# Patient Record
Sex: FEMALE | Race: WHITE | NOT HISPANIC OR LATINO | Employment: OTHER | URBAN - METROPOLITAN AREA
[De-identification: names, ages, dates, MRNs, and addresses within clinical notes are randomized per-mention and may not be internally consistent; named-entity substitution may affect disease eponyms.]

---

## 2023-01-31 ENCOUNTER — APPOINTMENT (OUTPATIENT)
Dept: RADIOLOGY | Facility: CLINIC | Age: 43
End: 2023-01-31

## 2023-01-31 ENCOUNTER — OFFICE VISIT (OUTPATIENT)
Dept: OBGYN CLINIC | Facility: CLINIC | Age: 43
End: 2023-01-31

## 2023-01-31 VITALS
HEART RATE: 96 BPM | HEIGHT: 67 IN | SYSTOLIC BLOOD PRESSURE: 133 MMHG | DIASTOLIC BLOOD PRESSURE: 81 MMHG | BODY MASS INDEX: 43.38 KG/M2 | WEIGHT: 276.4 LBS

## 2023-01-31 DIAGNOSIS — M76.51 PATELLAR TENDONITIS OF RIGHT KNEE: Primary | ICD-10-CM

## 2023-01-31 DIAGNOSIS — Z01.89 ENCOUNTER FOR LOWER EXTREMITY COMPARISON IMAGING STUDY: ICD-10-CM

## 2023-01-31 DIAGNOSIS — M25.561 RIGHT KNEE PAIN, UNSPECIFIED CHRONICITY: ICD-10-CM

## 2023-01-31 NOTE — LETTER
January 31, 2023     Warren Silva, DO  [address] [address]    Patient: Monica Muñiz   YOB: 1980   Date of Visit: 1/31/2023     Dear Dr Sujata Pedersen      Thank you for referring Shannon Alberto Israel to me for evaluation  Below are the relevant portions of my assessment and plan of care  If you have questions, please do not hesitate to call me  I look forward to following Shannon Alberto along with you           Sincerely,        Clarissa Marquez MD        CC: No Recipients    Progress Notes:

## 2023-01-31 NOTE — PROGRESS NOTES
Assessment/Plan:  1  Patellar tendonitis of right knee  XR knee 1 or 2 vw left    Ambulatory Referral to Physical Therapy      2  Right knee pain, unspecified chronicity  XR knee 3 vw right non injury    Ambulatory Referral to Physical Therapy        Scribe Attestation    I,:  Rosmery Torres am acting as a scribe while in the presence of the attending physician :       I,:  Kevin Saeed MD personally performed the services described in this documentation    as scribed in my presence :         Jr Golden upon examination and review of the x-rays of the right knee does demonstrate signs and symptoms consistent with patellar tendinitis  I did note that there is a chance that there is a small partial tear  However, her extensor mechanism is intact and does demonstrate a stable knee with functional range of motion in all planes  I do believe nonoperative care is most appropriate for her in the form of physical therapy  I did provide her with a referral for this today  She may continue with activities of daily living as tolerated  She may utilize ice for 20 minutes at a time after activities or prior to bed  She may also utilize oral analgesics as needed and directed on the bottle  She will follow-up in approximate 6 weeks time for repeat clinical evaluation  If she fails have symptomatic relief at that time with physical therapy we will consider ordering MRI of the right knee  Subjective:   Ji Jaquez is a 38 y o  female who presents for initial evaluation of her right knee  She is referred to me today by Dr José Miguel Chambers  She states that she suffered an extension injury of her knee while getting out of a pool this summer  She states that she did feel a tearing sensation anteriorly  She states that weightbearing activities as well as asending and descending stairs will exacerbate her pain  She localizes to the anterior aspect and and medially of the knee    Her pain is typically better while rest   She denies any distal paresthesias  Review of Systems   Constitutional: Negative for chills, fever and unexpected weight change  HENT: Negative for hearing loss, nosebleeds and sore throat  Eyes: Negative for pain, redness and visual disturbance  Respiratory: Negative for cough, shortness of breath and wheezing  Cardiovascular: Negative for chest pain, palpitations and leg swelling  Gastrointestinal: Negative for abdominal pain, nausea and vomiting  Endocrine: Negative for polydipsia and polyuria  Genitourinary: Negative for dysuria and hematuria  Musculoskeletal: Positive for arthralgias and myalgias  See HPI   Skin: Negative for rash and wound  Neurological: Negative for dizziness, numbness and headaches  Psychiatric/Behavioral: Negative for decreased concentration and suicidal ideas  The patient is not nervous/anxious  History reviewed  No pertinent past medical history  Past Surgical History:   Procedure Laterality Date   • BARIATRIC SURGERY  3/15/2018    Sleeve surgery   • SHOULDER SURGERY  2/14/2012       Family History   Problem Relation Age of Onset   • No Known Problems Mother    • No Known Problems Father        Social History     Occupational History   • Not on file   Tobacco Use   • Smoking status: Never   • Smokeless tobacco: Never   Vaping Use   • Vaping Use: Never used   Substance and Sexual Activity   • Alcohol use: Yes     Alcohol/week: 2 0 standard drinks     Types: 2 Glasses of wine per week     Comment: occasionally   • Drug use: Never   • Sexual activity: Yes     Partners: Male       No current outpatient medications on file  No Known Allergies    Objective:  Vitals:    01/31/23 1040   BP: 133/81   Pulse: 96       Right Knee Exam     Tenderness   Right knee tenderness location: medial board of the patellar tendon, medial retinaculum      Range of Motion   Extension: 0   Flexion: 120     Tests   Varus: negative Valgus: negative  Drawer: Anterior - negative    Posterior - negative    Other   Erythema: absent  Scars: absent  Sensation: normal  Pulse: present  Swelling: none  Effusion: no effusion present          Observations     Right Knee   Negative for effusion  Physical Exam  Vitals reviewed  HENT:      Head: Normocephalic and atraumatic  Eyes:      General:         Right eye: No discharge  Left eye: No discharge  Conjunctiva/sclera: Conjunctivae normal       Pupils: Pupils are equal, round, and reactive to light  Cardiovascular:      Rate and Rhythm: Normal rate  Pulmonary:      Effort: Pulmonary effort is normal  No respiratory distress  Musculoskeletal:      Cervical back: Normal range of motion and neck supple  Right knee: No effusion  Comments: As noted in HPI   Skin:     General: Skin is warm and dry  Neurological:      Mental Status: She is alert and oriented to person, place, and time  I have personally reviewed pertinent films in PACS and my interpretation is as follows:    X-ray of the right knee demonstrate no acute fracture or other osseous abnoralities      This document was created using speech voice recognition software  Grammatical errors, random word insertions, pronoun errors, and incomplete sentences are an occasional consequence of this system due to software limitations, ambient noise, and hardware issues  Any formal questions or concerns about content, text, or information contained within the body of this dictation should be directly addressed to the provider for clarification